# Patient Record
Sex: FEMALE | Race: OTHER | HISPANIC OR LATINO | URBAN - METROPOLITAN AREA
[De-identification: names, ages, dates, MRNs, and addresses within clinical notes are randomized per-mention and may not be internally consistent; named-entity substitution may affect disease eponyms.]

---

## 2023-05-22 ENCOUNTER — HOSPITAL ENCOUNTER (EMERGENCY)
Facility: HOSPITAL | Age: 28
Discharge: HOME | End: 2023-05-22
Attending: EMERGENCY MEDICINE

## 2023-05-22 VITALS
TEMPERATURE: 97.9 F | WEIGHT: 132.5 LBS | SYSTOLIC BLOOD PRESSURE: 105 MMHG | HEART RATE: 90 BPM | RESPIRATION RATE: 18 BRPM | DIASTOLIC BLOOD PRESSURE: 67 MMHG | OXYGEN SATURATION: 100 %

## 2023-05-22 DIAGNOSIS — R11.0 NAUSEA: ICD-10-CM

## 2023-05-22 DIAGNOSIS — Z34.90 PREGNANCY: Primary | ICD-10-CM

## 2023-05-22 LAB
BILIRUB UR QL STRIP: NEGATIVE
CLARITY UR: CLEAR
COLOR UR: YELLOW
COLOR, POC: YELLOW
EXT PREGNANCY TEST URINE: POSITIVE
EXT. CONTROL: ABNORMAL
GLUCOSE UR STRIP-MCNC: NEGATIVE MG/DL
HGB UR QL STRIP.AUTO: NEGATIVE
KETONES UR STRIP-MCNC: NEGATIVE MG/DL
LEUKOCYTE ESTERASE UR QL STRIP: NEGATIVE
NITRITE UR QL STRIP: NEGATIVE
PH UR STRIP.AUTO: 7 [PH] (ref 4.5–8)
PROT UR STRIP-MCNC: NEGATIVE MG/DL
SP GR UR STRIP.AUTO: 1.02 (ref 1–1.03)
UROBILINOGEN UR QL STRIP.AUTO: 1 E.U./DL

## 2023-05-22 RX ORDER — ONDANSETRON 4 MG/1
4 TABLET, FILM COATED ORAL EVERY 6 HOURS
Qty: 12 TABLET | Refills: 0 | Status: SHIPPED | OUTPATIENT
Start: 2023-05-22 | End: 2023-05-22

## 2023-05-22 RX ORDER — METOCLOPRAMIDE 10 MG/1
10 TABLET ORAL EVERY 6 HOURS PRN
Qty: 20 TABLET | Refills: 0 | Status: SHIPPED | OUTPATIENT
Start: 2023-05-22

## 2023-05-22 RX ORDER — ONDANSETRON 4 MG/1
4 TABLET, ORALLY DISINTEGRATING ORAL ONCE
Status: COMPLETED | OUTPATIENT
Start: 2023-05-22 | End: 2023-05-22

## 2023-05-22 RX ORDER — ACETAMINOPHEN 325 MG/1
975 TABLET ORAL ONCE
Status: DISCONTINUED | OUTPATIENT
Start: 2023-05-22 | End: 2023-05-22

## 2023-05-22 RX ADMIN — ONDANSETRON 4 MG: 4 TABLET, ORALLY DISINTEGRATING ORAL at 15:20

## 2023-05-22 NOTE — Clinical Note
Donnie Cornelius was seen and treated in our emergency department on 5/22/2023  Diagnosis:     Janett Perales  may return to work on return date  She may return on this date: 05/23/2023         If you have any questions or concerns, please don't hesitate to call        Vignesh Sanderson MD    ______________________________           _______________          _______________  Hospital Representative                              Date                                Time

## 2023-05-22 NOTE — ED PROVIDER NOTES
History  Chief Complaint   Patient presents with   • Pregnancy Test     Pt could like to have a pregnancy last menstrual cycle was 2 weeks or a month go  Pt has been having nausea with vomiting a few time and she took a test at home and it was positive  Pt denies abdominal pain, bleeding,and discharge      Jasmin Amin is a very pleasant 25-year-old female here requesting confirmation of pregnancy  She states that she is about 1 week late on her period and took a home pregnancy test that was positive  She does not have any significant abdominal or pelvic pain, vaginal bleeding, abnormal discharge, or urinary symptoms  She is having some nausea and had a few episodes of nonbloody, nonbilious vomiting over the past few days  She has not yet seen an OB/GYN  She has 2 children at home  Denies other significant past medical history  History provided by:  Patient  Pregnancy Test  Associated symptoms: nausea and vomiting    Associated symptoms: no abdominal pain and no shortness of breath        None       History reviewed  No pertinent past medical history  History reviewed  No pertinent surgical history  History reviewed  No pertinent family history  I have reviewed and agree with the history as documented  E-Cigarette/Vaping     E-Cigarette/Vaping Substances     Social History     Tobacco Use   • Smoking status: Never   • Smokeless tobacco: Never   Substance Use Topics   • Alcohol use: Yes     Comment: occ   • Drug use: Never       Review of Systems   Respiratory: Negative for chest tightness and shortness of breath  Cardiovascular: Negative for leg swelling  Gastrointestinal: Positive for nausea and vomiting  Negative for abdominal pain  Genitourinary: Positive for menstrual problem  Negative for difficulty urinating, dyspareunia, dysuria, frequency, pelvic pain, vaginal bleeding and vaginal discharge  Physical Exam  Physical Exam  Vitals and nursing note reviewed     Constitutional: General: She is not in acute distress  Appearance: She is well-developed  HENT:      Head: Normocephalic and atraumatic  Eyes:      Conjunctiva/sclera: Conjunctivae normal    Cardiovascular:      Rate and Rhythm: Normal rate and regular rhythm  Heart sounds: No murmur heard  Pulmonary:      Effort: Pulmonary effort is normal  No respiratory distress  Breath sounds: Normal breath sounds  Abdominal:      Palpations: Abdomen is soft  Tenderness: There is no abdominal tenderness  Musculoskeletal:         General: No swelling  Cervical back: Neck supple  Skin:     General: Skin is warm and dry  Capillary Refill: Capillary refill takes less than 2 seconds  Neurological:      General: No focal deficit present  Mental Status: She is alert and oriented to person, place, and time     Psychiatric:         Mood and Affect: Mood normal          Vital Signs  ED Triage Vitals [05/22/23 1430]   Temperature Pulse Respirations Blood Pressure SpO2   97 9 °F (36 6 °C) 90 18 105/67 100 %      Temp src Heart Rate Source Patient Position - Orthostatic VS BP Location FiO2 (%)   -- -- Sitting Right arm --      Pain Score       --           Vitals:    05/22/23 1430   BP: 105/67   Pulse: 90   Patient Position - Orthostatic VS: Sitting         Visual Acuity      ED Medications  Medications   ondansetron (ZOFRAN-ODT) dispersible tablet 4 mg (4 mg Oral Given 5/22/23 1520)       Diagnostic Studies  Results Reviewed     Procedure Component Value Units Date/Time    Urine Macroscopic, POC [[id number]] Collected: 05/22/23 1509    Lab Status: Final result Specimen: Urine Updated: 05/22/23 1510     Color, UA Yellow     Clarity, UA Clear     pH, UA 7 0     Leukocytes, UA Negative     Nitrite, UA Negative     Protein, UA Negative mg/dl      Glucose, UA Negative mg/dl      Ketones, UA Negative mg/dl      Urobilinogen, UA 1 0 E U /dl      Bilirubin, UA Negative     Occult Blood, UA Negative     Specific Gravity, UA 1 025    Narrative:      CLINITEK RESULT    POCT pregnancy, urine [[id number]]  (Abnormal) Resulted: 05/22/23 1510    Lab Status: Final result Updated: 05/22/23 1510     EXT Preg Test, Ur Positive     Control Valid    POCT urinalysis dipstick [[id number]]  (Normal) Resulted: 05/22/23 1510    Lab Status: Final result Specimen: Urine Updated: 05/22/23 1510     Color, UA Yellow     Clarity, UA --     EXT Leukocytes, UA --     Nitrite, UA --     Protein, UA -- mg/dl      Glucose, UA --     Ketones, UA -- mg/dl      EXT Urobilinogen, UA --      Bilirubin, UA --     Blood, UA --                 No orders to display              Procedures  Procedures         ED Course                               SBIRT 20yo+    Flowsheet Row Most Recent Value   Initial Alcohol Screen: US AUDIT-C     1  How often do you have a drink containing alcohol? 1 Filed at: 05/22/2023 1437   2  How many drinks containing alcohol do you have on a typical day you are drinking? 1 Filed at: 05/22/2023 1437   3a  Male UNDER 65: How often do you have five or more drinks on one occasion? 0 Filed at: 05/22/2023 1437   3b  FEMALE Any Age, or MALE 65+: How often do you have 4 or more drinks on one occassion? 0 Filed at: 05/22/2023 1437   Audit-C Score 2 Filed at: 05/22/2023 1437   MAGGY: How many times in the past year have you    Used an illegal drug or used a prescription medication for non-medical reasons? Never Filed at: 05/22/2023 1437                    Medical Decision Making  55-year-old female requesting confirmation of pregnancy  Has had some nausea and vomiting similar to previous pregnancies but denies other OB complaints  Specifically no pelvic or abdominal pain, vaginal bleeding or discharge, urinary symptoms  No fevers or other illnesses  States she does not have a local OB, will give referral   Gave Zofran in ED  Discharged with prescription for Reglan (preferable due to cost )  Discussed return precautions      Nausea: acute illness or injury  Pregnancy: acute illness or injury  Amount and/or Complexity of Data Reviewed  Labs: ordered  Decision-making details documented in ED Course  Details: Pregnancy test positive  UA unremarkable, no sign of UTI  Risk  Prescription drug management  Disposition  Final diagnoses:   Pregnancy   Nausea     Time reflects when diagnosis was documented in both MDM as applicable and the Disposition within this note     Time User Action Codes Description Comment    5/22/2023  3:17 PM Román Lange Add [Z34 90] Pregnancy     5/22/2023  3:17 PM Román Lange Add [R11 0] Nausea       ED Disposition     ED Disposition   Discharge    Condition   Stable    Date/Time   Mon May 22, 2023 [address] discharge to home/self care  Follow-up Information     Follow up With Specialties Details Why Contact Info Additional [address] [facility]   [address]  [address] [address]  [address] [place] [address], [address] [place] [address] Rd, [address] [place] [address], [address] [phone]          Discharge Medication List as of 5/22/2023  3:42 PM      START taking these medications    Details   metoclopramide (REGLAN) 10 mg tablet Take 1 tablet (10 mg total) by mouth every 6 (six) hours as needed (n/v or headache), Starting Mon 5/22/2023, Normal             No discharge procedures on file      PDMP Review     None          ED Provider  Electronically Signed by           Sophie Hankins MD  05/22/23 7917

## 2023-05-22 NOTE — Clinical Note
Jg Gore was seen and treated in our emergency department on 5/22/2023  Diagnosis:     Ashley Garland  may return to work on return date  She may return on this date: 05/23/2023         If you have any questions or concerns, please don't hesitate to call        Nahid Parson MD    ______________________________           _______________          _______________  Hospital Representative                              Date                                Time

## 2023-06-11 ENCOUNTER — HOSPITAL ENCOUNTER (EMERGENCY)
Facility: HOSPITAL | Age: 28
Discharge: HOME | End: 2023-06-11
Attending: EMERGENCY MEDICINE

## 2023-06-11 VITALS
HEIGHT: 61 IN | DIASTOLIC BLOOD PRESSURE: 64 MMHG | SYSTOLIC BLOOD PRESSURE: 107 MMHG | HEART RATE: 102 BPM | WEIGHT: 133.82 LBS | TEMPERATURE: 97.8 F | OXYGEN SATURATION: 98 % | BODY MASS INDEX: 25.27 KG/M2 | RESPIRATION RATE: 18 BRPM

## 2023-06-11 DIAGNOSIS — Z34.90 PREGNANCY: Primary | ICD-10-CM

## 2023-06-11 PROCEDURE — 99283 EMERGENCY DEPT VISIT LOW MDM: CPT | Performed by: EMERGENCY MEDICINE

## 2023-06-11 PROCEDURE — 99283 EMERGENCY DEPT VISIT LOW MDM: CPT

## 2023-06-11 NOTE — ED ATTENDING ATTESTATION
2023  IDo DO, saw and evaluated the patient  I have discussed the patient with the resident/non-physician practitioner and agree with the resident's/non-physician practitioner's findings, Plan of Care, and MDM as documented in the resident's/non-physician practitioner's note, except where noted  All available labs and Radiology studies were reviewed  I was present for key portions of any procedure(s) performed by the resident/non-physician practitioner and I was immediately available to provide assistance  At this point I agree with the current assessment done in the Emergency Department  I have conducted an independent evaluation of this patient a history and physical is as follows:      A 78-year-old female with no significant past medical history; presents requesting medical   Patient is a  at approximately 6 weeks gestation (pregnancy confirmed with UPT on recent ED visit, )  She has not yet seen OB  She currently denies all complaints including fever, chills, dizziness, lightheadedness, chest pain, shortness of breath, abdominal pain, nausea, vomiting, diarrhea, vaginal bleeding, vaginal discharge, dysuria, peripheral edema and rashes  Physical Exam  General Appearance: alert and oriented, nad, non toxic appearing  Skin:  Warm, dry, intact  HEENT: atraumatic, normocephalic  Neck: Supple, trachea midline  Cardiac: RRR; no murmurs, rub, gallops  Pulmonary: lungs CTAB; no wheezes, rales, rhonchi  Gastrointestinal: abdomen soft, nontender, nondistended; no guarding or rebound tenderness; good bowel sounds, no mass or bruits  Extremities:  no pedal edema, 2+ pulses; no calf tenderness, no clubbing, no cyanosis  Neuro:  no focal motor or sensory deficits, CN 2-12 grossly intact  Psych:  Normal mood and affect, normal judgement and insight    Assessment and Plan:  First trimester pregnancy, patient currently asymptomatic  Requesting medical     Discussed that she will need to follow-up with [facility] for this, she is in agreement      ED Course         Critical Care Time  Procedures

## 2023-06-11 NOTE — DISCHARGE INSTRUCTIONS
You have been seen for wanting an   This is not a procedure done in the Emergency Room  You should return to the ED if you develop abdominal pain, bleeding, fevers, syncope, or other worsening symptoms  Follow up with [facility] at [phone]

## 2023-06-11 NOTE — ED PROVIDER NOTES
History  Chief Complaint   Patient presents with   • Pregnancy Problem     Pt reporting she is 6 wks pregnant, seeking medication for   25-year-old female with no significant past medical history who is a G3, P2 and is about 6 weeks pregnant based on her estimations presents for evaluation  Patient was seen here a couple weeks ago and diagnosed with pregnancy  Since then, she has not been having any fevers, abdominal pain, nausea, vomiting, vaginal bleeding, or abnormal vaginal discharge  She states that she has not yet tried to contact an OB/GYN  Patient is here today because she would like a medical   Prior to Admission Medications   Prescriptions Last Dose Informant Patient Reported? Taking?   metoclopramide (REGLAN) 10 mg tablet   No No   Sig: Take 1 tablet (10 mg total) by mouth every 6 (six) hours as needed (n/v or headache)      Facility-Administered Medications: None       History reviewed  No pertinent past medical history  History reviewed  No pertinent surgical history  History reviewed  No pertinent family history  I have reviewed and agree with the history as documented  E-Cigarette/Vaping     E-Cigarette/Vaping Substances     Social History     Tobacco Use   • Smoking status: Never   • Smokeless tobacco: Never   Substance Use Topics   • Alcohol use: Yes     Comment: occ   • Drug use: Never        Review of Systems   Constitutional: Negative for chills, fatigue and fever  HENT: Negative for congestion, rhinorrhea and sore throat  Eyes: Negative for pain and redness  Respiratory: Negative for cough, chest tightness, shortness of breath and wheezing  Cardiovascular: Negative for chest pain and palpitations  Gastrointestinal: Negative for abdominal pain, diarrhea, nausea and vomiting  Endocrine: Negative  Genitourinary: Negative for difficulty urinating and hematuria  Musculoskeletal: Negative for back pain and myalgias     Skin: Negative for pallor and rash  Allergic/Immunologic: Negative  Neurological: Negative for dizziness, weakness, light-headedness and headaches  Hematological: Negative  Physical Exam  ED Triage Vitals [06/11/23 1045]   Temperature Pulse Respirations Blood Pressure SpO2   97 8 °F (36 6 °C) 102 18 107/64 98 %      Temp Source Heart Rate Source Patient Position - Orthostatic VS BP Location FiO2 (%)   Oral Monitor Sitting Right arm --      Pain Score       No Pain             Orthostatic Vital Signs  Vitals:    06/11/23 1045   BP: 107/64   Pulse: 102   Patient Position - Orthostatic VS: Sitting       Physical Exam  Vitals and nursing note reviewed  Constitutional:       General: She is not in acute distress  Appearance: Normal appearance  She is not ill-appearing  HENT:      Head: Normocephalic and atraumatic  Eyes:      Conjunctiva/sclera: Conjunctivae normal    Cardiovascular:      Rate and Rhythm: Normal rate and regular rhythm  Pulmonary:      Effort: Pulmonary effort is normal  No respiratory distress  Abdominal:      General: Abdomen is flat  There is no distension  Palpations: Abdomen is soft  Tenderness: There is no abdominal tenderness  Musculoskeletal:         General: Normal range of motion  Cervical back: Normal range of motion and neck supple  Skin:     General: Skin is warm and dry  Neurological:      General: No focal deficit present  Mental Status: She is alert and oriented to person, place, and time  ED Medications  Medications - No data to display    Diagnostic Studies  Results Reviewed     None                 No orders to display         Procedures  Procedures      ED Course                             SBIRT 20yo+    Flowsheet Row Most Recent Value   Initial Alcohol Screen: US AUDIT-C     1  How often do you have a drink containing alcohol? 1 Filed at: 06/11/2023 1047   2  How many drinks containing alcohol do you have on a typical day you are drinking? 0 Filed at: 2023 1047   3b  FEMALE Any Age, or MALE 65+: How often do you have 4 or more drinks on one occassion? 0 Filed at: 2023 1047   Audit-C Score 1 Filed at: 2023 1047   MAGGY: How many times in the past year have you    Used an illegal drug or used a prescription medication for non-medical reasons? Never Filed at: 2023 1047                Medical Decision Making  61-year-old female presents requesting a medical   Patient is not having any pain or complaints at this time  Therefore, no further work-up is needed to confirm intrauterine pregnancy or other acute abdominal processes  I advised patient that we do not do abortions in the emergency room  [facility] as a whole does not do elective abortions  I advised patient that she will need to see [facility] to go through with an elective   Discussed all results and plans with patient  Recommend follow up with [facility]  Return precautions given  All questions answered  Pregnancy: acute illness or injury  Amount and/or Complexity of Data Reviewed  External Data Reviewed: notes  Details: Past medical history and medications  Reviewed ED note from             Disposition  Final diagnoses:   Pregnancy     Time reflects when diagnosis was documented in both MDM as applicable and the Disposition within this note     Time User Action Codes Description Comment    2023 11:00 AM James Jones Add [Z34 90] Pregnancy       ED Disposition     ED Disposition   Discharge    Condition   Good    Date/Time   Sun 2023 11:00 AM    Comment   Shelli Adan discharge to home/self care                 Follow-up Information    None         Discharge Medication List as of 2023 11:02 AM      CONTINUE these medications which have NOT CHANGED    Details   metoclopramide (REGLAN) 10 mg tablet Take 1 tablet (10 mg total) by mouth every 6 (six) hours as needed (n/v or headache), Starting Mon 5/22/2023, Normal           No discharge procedures on file  PDMP Review     None           ED Provider  Attending physically available and evaluated Nikko Orozco I managed the patient along with the ED Attending      Electronically Signed by         Corey Kapoor DO  06/11/23 1128